# Patient Record
Sex: FEMALE | URBAN - METROPOLITAN AREA
[De-identification: names, ages, dates, MRNs, and addresses within clinical notes are randomized per-mention and may not be internally consistent; named-entity substitution may affect disease eponyms.]

---

## 2017-04-17 ENCOUNTER — APPOINTMENT (OUTPATIENT)
Age: 47
Setting detail: DERMATOLOGY
End: 2017-04-18

## 2017-04-17 VITALS — WEIGHT: 128 LBS | HEIGHT: 68 IN

## 2017-04-17 DIAGNOSIS — L70.0 ACNE VULGARIS: ICD-10-CM

## 2017-04-17 DIAGNOSIS — L72.8 OTHER FOLLICULAR CYSTS OF THE SKIN AND SUBCUTANEOUS TISSUE: ICD-10-CM

## 2017-04-17 PROCEDURE — OTHER INCISION AND DRAINAGE: OTHER

## 2017-04-17 PROCEDURE — OTHER TREATMENT REGIMEN: OTHER

## 2017-04-17 PROCEDURE — OTHER PRESCRIPTION: OTHER

## 2017-04-17 PROCEDURE — OTHER MIPS QUALITY: OTHER

## 2017-04-17 PROCEDURE — OTHER ORDER TESTS: OTHER

## 2017-04-17 PROCEDURE — 99202 OFFICE O/P NEW SF 15 MIN: CPT | Mod: 25

## 2017-04-17 PROCEDURE — 10060 I&D ABSCESS SIMPLE/SINGLE: CPT

## 2017-04-17 RX ORDER — SULFAMETHOXAZOLE AND TRIMETHOPRIM 800; 160 MG/1; MG/1
1 PILL TABLET ORAL BID
Qty: 28 | Refills: 0 | Status: ERX | COMMUNITY
Start: 2017-04-17

## 2017-04-17 RX ORDER — MUPIROCIN 20 MG/G
THIN COAT OINTMENT TOPICAL TID
Qty: 1 | Refills: 0 | Status: ERX | COMMUNITY
Start: 2017-04-17

## 2017-04-17 ASSESSMENT — LOCATION DETAILED DESCRIPTION DERM
LOCATION DETAILED: LEFT LATERAL FOREHEAD
LOCATION DETAILED: LEFT INFERIOR CENTRAL MALAR CHEEK

## 2017-04-17 ASSESSMENT — LOCATION SIMPLE DESCRIPTION DERM
LOCATION SIMPLE: LEFT CHEEK
LOCATION SIMPLE: LEFT FOREHEAD

## 2017-04-17 ASSESSMENT — SEVERITY ASSESSMENT OVERALL AMONG ALL PATIENTS
IN YOUR EXPERIENCE, AMONG ALL PATIENTS YOU HAVE SEEN WITH THIS CONDITION, HOW SEVERE IS THIS PATIENT'S CONDITION?: MULTIPLE INFLAMMATORY LESIONS BUT NONINFLAMMATORY LESIONS PREDOMINATE

## 2017-04-17 ASSESSMENT — LOCATION ZONE DERM: LOCATION ZONE: FACE

## 2017-04-17 NOTE — PROCEDURE: TREATMENT REGIMEN
Detail Level: Detailed
Detail Level: Zone
Plan: We did discuss the possibility of treating this problem as well however, due to the nature of the lesion on the left forehead we will allow for healing prior to following up for her acne. She can follow up anytime to discuss treatment options including topical medications as well as possible oral medications
Plan: Incision and drainage was attempted with a number 11 blade however there was no expression of any contents. This appears to be most consistent with a pyogenic granuloma at this point. It may just require more time to allow for healing. However, if no improvement we may need to refer to plastic surgery for possible debridement and repair. Culture with Gram stain was sent to the laboratory. Results are pending. For now we will continue to Bactrim for 14 more days along with the Mupirocin 2% twice daily

## 2017-04-17 NOTE — PROCEDURE: MIPS QUALITY
Detail Level: Detailed
Quality 47: Advance Care Plan: Advance Care Planning discussed and documented in the medical record; patient did not wish or was not able to name a surrogate decision maker or provide an advance care plan.
Quality 111:Pneumonia Vaccination Status For Older Adults: Pneumococcal Vaccination not Administered or Previously Received, Reason not Otherwise Specified
Quality 110: Preventive Care And Screening: Influenza Immunization: Influenza Immunization Ordered or Recommended, but not Administered

## 2017-04-17 NOTE — PROCEDURE: INCISION AND DRAINAGE
Detail Level: Detailed
Size Of Lesion In Cm (Optional But May Be Required For Some Insurances): 1
Dressing: dry sterile dressing
Anesthesia Type: 1% Xylocaine with epinephrine
Wound Care: Polysporin ointment
Epidermal Closure: simple interrupted
Epidermal Sutures: 4-0 Ethilon
Anesthesia Volume In Cc: 0.4
Post-Care Instructions: I reviewed with the patient in detail post-care instructions. Patient should keep wound covered and call the office should any redness, pain, swelling or worsening occur.
Curette: No
Render Postcare In Note?: Yes
Preparation Text: The area was prepped in the usual clean fashion.
Lesion Type: Abscess
Suture Text: The incision was partially closed with
Consent was obtained and risks were reviewed including but not limited to delayed wound healing, infection, need for multiple I and D's, and pain.
Curette Text (Optional): After the contents were expressed a curette was used to partially remove the cyst wall.
Method: 11 blade